# Patient Record
Sex: MALE | Race: OTHER | HISPANIC OR LATINO | URBAN - METROPOLITAN AREA
[De-identification: names, ages, dates, MRNs, and addresses within clinical notes are randomized per-mention and may not be internally consistent; named-entity substitution may affect disease eponyms.]

---

## 2020-01-01 ENCOUNTER — EMERGENCY (EMERGENCY)
Facility: HOSPITAL | Age: 45
LOS: 1 days | Discharge: HOME | End: 2020-01-01
Attending: EMERGENCY MEDICINE
Payer: MEDICAID

## 2020-01-01 VITALS
OXYGEN SATURATION: 98 % | DIASTOLIC BLOOD PRESSURE: 74 MMHG | SYSTOLIC BLOOD PRESSURE: 119 MMHG | RESPIRATION RATE: 18 BRPM | TEMPERATURE: 98 F | HEART RATE: 96 BPM

## 2020-01-01 VITALS
OXYGEN SATURATION: 95 % | SYSTOLIC BLOOD PRESSURE: 134 MMHG | HEART RATE: 101 BPM | WEIGHT: 149.91 LBS | TEMPERATURE: 97 F | DIASTOLIC BLOOD PRESSURE: 83 MMHG | RESPIRATION RATE: 18 BRPM | HEIGHT: 61 IN

## 2020-01-01 PROCEDURE — 93005 ELECTROCARDIOGRAM TRACING: CPT

## 2020-01-01 PROCEDURE — 99285 EMERGENCY DEPT VISIT HI MDM: CPT | Mod: 25

## 2020-01-01 PROCEDURE — 99283 EMERGENCY DEPT VISIT LOW MDM: CPT

## 2020-01-01 PROCEDURE — 71046 X-RAY EXAM CHEST 2 VIEWS: CPT | Mod: 26

## 2020-01-01 PROCEDURE — 71046 X-RAY EXAM CHEST 2 VIEWS: CPT

## 2020-01-01 PROCEDURE — 99053 MED SERV 10PM-8AM 24 HR FAC: CPT

## 2020-01-01 PROCEDURE — 82962 GLUCOSE BLOOD TEST: CPT

## 2020-01-01 NOTE — ED PROVIDER NOTE - CLINICAL SUMMARY MEDICAL DECISION MAKING FREE TEXT BOX
No e/o rib fracture or intraabdominal injury. No e/o aspiration. No e/o intracranial ischemia. Patient is well appearing, NAD, afebrile, hemodynamically stable. Clinically sober. Tolerated PO well. Discharged with 2 brothers with instructions in further symptomatic care, return precautions, and need for PMD f/u.

## 2020-01-01 NOTE — ED PROVIDER NOTE - NSFOLLOWUPINSTRUCTIONS_ED_ALL_ED_FT
Please follow up with your primary care doctor in 1-2 days.  Please keep well hydrated.  Please return to the emergency department if you have chest pain, trouble breathing, headache, vomiting, or any other symptoms.

## 2020-01-01 NOTE — ED PROVIDER NOTE - PATIENT PORTAL LINK FT
You can access the FollowMyHealth Patient Portal offered by [facility] by registering at the following website: [email or web address]. By joining [facility]’s FollowMyHealth portal, you will also be able to view your health information using other applications (apps) compatible with our system.

## 2020-01-01 NOTE — ED ADULT NURSE NOTE - CHIEF COMPLAINT QUOTE
BIBA for intoxication and choking on his food, vomited once and synopsized for 10 mins as per family member

## 2020-01-01 NOTE — ED PROVIDER NOTE - PHYSICAL EXAMINATION
Afebrile, hemodynamically stable, saturating well  NAD, very well appearing  Head NCAT  PERRL, EOMI grossly, anicteric  MMM  RRR, nml S1/S2, no m/r/g  Lungs CTAB, no w/r/r  Abd soft, NT, ND, nml BS, no rebound or guarding or bruising  AAO, CN's 3-12 grossly intact  JEFFREY spontaneously, no leg cyanosis or edema  Skin warm, well perfused, no rashes or hives

## 2020-01-01 NOTE — ED ADULT TRIAGE NOTE - CHIEF COMPLAINT QUOTE
BIBA for intoxication and choking on his food, synopsized for 10 mins as per family member BIBA for intoxication and choking on his food, vomited once and synopsized for 10 mins as per family member

## 2020-01-01 NOTE — ED PROVIDER NOTE - OBJECTIVE STATEMENT
44 year old male with no significant PMHx presents to the ED for evaluation after choking today. Patient states that he was at a New Year's Party drinking beers and cooking when he went to try one of his tacos. Patient then states that his brother found him on the floor choking and passed out. Brother moved the patient to the side and then attempted to do the Heimlich maneuver on the belly. No food came up, however, the patient then regained consciousness and was back to baseline. Patient denies any trouble breathing, cough, headache, blurry vision, chest pain, abd pain, or any other acute complaints. Patient is a non-smoker. Declines , uses himself.  44 year old male with no significant PMHx presents to the ED for evaluation after choking today. Patient states that he was at a New Year's Party drinking beers and cooking when he went to try one of his tacos. Patient then states that his brother found him on the floor choking and passed out. Brother moved the patient to the side and then attempted to do the Heimlich maneuver on the belly. No food came up, however the patient then regained consciousness and was back to baseline. He denies all complaints. Patient denies any trouble breathing, cough, headache, blurry vision, chest pain, abd pain, or any other acute complaints. Patient is a non-smoker.

## 2020-08-07 ENCOUNTER — APPOINTMENT (OUTPATIENT)
Dept: OTOLARYNGOLOGY | Facility: CLINIC | Age: 45
End: 2020-08-07
Payer: SELF-PAY

## 2020-08-07 VITALS — BODY MASS INDEX: 33.38 KG/M2 | WEIGHT: 170 LBS | TEMPERATURE: 97.4 F | HEIGHT: 60 IN

## 2020-08-07 DIAGNOSIS — H93.292 OTHER ABNORMAL AUDITORY PERCEPTIONS, LEFT EAR: ICD-10-CM

## 2020-08-07 DIAGNOSIS — H61.23 IMPACTED CERUMEN, BILATERAL: ICD-10-CM

## 2020-08-07 PROBLEM — Z00.00 ENCOUNTER FOR PREVENTIVE HEALTH EXAMINATION: Status: ACTIVE | Noted: 2020-08-07

## 2020-08-07 PROBLEM — Z78.9 OTHER SPECIFIED HEALTH STATUS: Chronic | Status: ACTIVE | Noted: 2020-01-01

## 2020-08-07 PROCEDURE — 99203 OFFICE O/P NEW LOW 30 MIN: CPT

## 2020-08-07 NOTE — ASSESSMENT
[FreeTextEntry1] : Cerumen impaction was removed. He felt much better afterwards.  He felt that his hearing was normal.\par \par PLAN\par \par -findings and management options discussed in detail with the patient. \par -good aural hygiene\par -avoid using cotton swabs in the ears\par -wax removal drops as needed. \par -noise precautions. he should continue to use earplugs\par -I asked him to consider audiogram to rule out hearing loss. He deferred today.\par He will follow up as needed to check his ears-
